# Patient Record
Sex: FEMALE | Race: WHITE
[De-identification: names, ages, dates, MRNs, and addresses within clinical notes are randomized per-mention and may not be internally consistent; named-entity substitution may affect disease eponyms.]

---

## 2019-04-23 ENCOUNTER — HOSPITAL ENCOUNTER (EMERGENCY)
Dept: HOSPITAL 80 - FED | Age: 34
Discharge: HOME | End: 2019-04-23
Payer: COMMERCIAL

## 2019-04-23 VITALS — DIASTOLIC BLOOD PRESSURE: 97 MMHG | SYSTOLIC BLOOD PRESSURE: 132 MMHG

## 2019-04-23 DIAGNOSIS — S92.501A: Primary | ICD-10-CM
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
## 2019-04-23 DIAGNOSIS — S90.121A: ICD-10-CM

## 2019-04-23 PROCEDURE — L4386 NON-PNEUM WALK BOOT PRE CST: HCPCS

## 2019-04-23 NOTE — EDPHY
H & P


Stated Complaint: foot injury


Time Seen by Provider: 04/23/19 19:28


HPI/ROS: 


HPI:  This is a 33-year-old female presents with





Chief Complaint:  Left little toe injury





Location:  Left little toe


Quality:  Injury


Duration:  Prior to arrival


Signs and Symptoms:  No bleeding, no radiation, no numbness, no weakness, no 

tingling, no incontinence, + decreased range of motion, + swelling, + pain, no 

fever


Timing:  Acute, constant, worse with weight-bearing


Severity:  7/10


Context:  Patient reports that she was lifting weights and accidentally dropped 

a 10 lb weight on her right little toe.  She reports that she felt immediate, 

constant, severe pain.  She complains of swelling base of right little toe pain 

that is sharp and constant nature.  The pain radiates up into her right ankle.  

Pain worsens weight-bearing.  Denies paresthesias, numbness, weakness.


Modifying Factors:  No over-the-counter medications or ice pack applied





Comment: 








ROS:  A comprehensive 10 system review of systems is otherwise negative aside 

from elements mentioned in the history of present illness. 








MEDICAL/SURGICAL/SOCIAL HISTORY: 


Medical history:  Hypothyroidism.  LMP 1-2 weeks ago.


Surgical history:  Denies


Social history:  Employed, nonsmoker.











CONSTITUTIONAL:  Appears uncomfortable, adult white female, awake and alert, no 

obvious distress


HEENT: Atraumatic and normocephalic, PERRL, EOMI.  Nares patent; no rhinorrhea;

  no nasal mucosal edema. Tympanic membranes clear. Oropharynx clear, no 

exudate and moist pink mucosa.  Airway patent.  No lymphadenopathy.  No 

meningismus.


Cardiovascular: Normal S1/S2, regular rate, regular rhythm, without murmur rub 

or gallop.


PULMONARY/CHEST:  Symmetrical and nontender. Clear to auscultation bilaterally. 

Good air movement. No accessory muscle usage.


ABDOMEN:  Soft, nondistended, nontender, no rebound, no guarding, no peritoneal 

signs, no masses or organomegaly. No CVAT.


EXTREMITIES:  2/2 pedal pulses, right Ankle: Plantar flexion to 50, 

dorsiflexion to 20.  Foot inversion to 35 degree.  No tenderness/swelling 

Anterior talofibular ligament.  No tenderness/swelling Calcaneofibular ligament

, no tenderness/swelling posterior talofibular ligament, no tenderness/swelling 

posterior inferior tibiofibular ligament. Achilles tendon intact.  Right 5th 

toe at the MTP joint shows tenderness with palpation with moderate swelling but 

no bruising.  Pain is increased with flexion.  Flexion and extension of the IP 

and MTP joints are fully intact.  Light touch sensation intact.  strength 5/5, 

no deformities, no clubbing, no cyanosis or edema.


NEUROLOGICAL: no focal neuro deficits.  GCS 15.


SKIN: Warm and dry, no erythema. no rash.  Good capillary refill. 


  





Source: Patient


Exam Limitations: No limitations





- Personal History


LMP (Females 10-55): 8-14 Days Ago


Current Tetanus/Diphtheria Vaccine: Yes


Current Tetanus Diphtheria and Acellular Pertussis (TDAP): Yes





- Medical/Surgical History


Hx Asthma: No


Hx Chronic Respiratory Disease: No


Hx Diabetes: No


Hx Cardiac Disease: No


Hx Renal Disease: No


Hx Cirrhosis: No


Hx Alcoholism: No


Hx HIV/AIDS: No


Hx Splenectomy or Spleen Trauma: No





- Social History


Smoking Status: Never smoked


Constitutional: 


 Initial Vital Signs











Heart Rate  76   04/23/19 19:11


 


Respiratory Rate  18   04/23/19 19:11


 


Blood Pressure  122/102 H  04/23/19 19:11


 


O2 Sat (%)  96   04/23/19 19:11








 











O2 Delivery Mode               Room Air














Allergies/Adverse Reactions: 


 





No Known Allergies Allergy (Unverified 04/23/19 19:10)


 








Home Medications: 














 Medication  Instructions  Recorded


 


Levothyroxine  04/23/19














Medical Decision Making





- Diagnostics


Imaging Results: 


 Imaging Impressions





Toe X-Ray  04/23/19 19:18


Impression: Suspect artifact rather than nondisplaced fracture distal phalanx 

fifth toe.











Procedures: 


Procedure:  Splint placement.





A right walking boot was applied.  After application of the splint I returned 

and re-examined the patient.  The splint was adequately immobilizing the joint 

and distal to the splint the patient's circulation and sensation was intact.





ED Course/Re-evaluation: 


Vital signs reviewed and stable upon arrival


Right foot x-ray at bedside shows suspected nondisplaced fracture of the 5th 

toe.


Radiology read: Suspect artifact rather than nondisplaced fracture distal 

phalanx fifth toe.


Patient is traveling on an airplane in 2 days.


Placed in walking boot, crutches, orthopedic follow-up





No signs of neurovascular compromise/tenting of skin/compartment syndrome/

extremities and joints examined above and below area of concern and are 

neurovascularly intact.








This patient was seen under the supervision of my secondary supervising 

physician.  I evaluated and cared for this patient independently. 











Differential Diagnosis: 


Ankle injury differential diagnosis includes but is not limited to tibia 

fracture, fibula fracture, metatarsal fracture, LisFranc fracture, achilles 

tendon rupture, sprain.








- Data Points


Medications Given: 


 








Discontinued Medications





Ibuprofen (Motrin)  600 mg PO EDNOW ONE


   Stop: 04/23/19 19:18


   Last Admin: 04/23/19 19:22 Dose:  600 mg








Departure





- Departure


Disposition: Home, Routine, Self-Care


Clinical Impression: 


Closed fracture of phalanx of right fifth toe


Qualifiers:


 Encounter type: initial encounter Qualified Code(s): S92.501A - Displaced 

unspecified fracture of right lesser toe(s), initial encounter for closed 

fracture





Contusion of fifth toe, right


Qualifiers:


 Encounter type: initial encounter Qualified Code(s): S90.121A - Contusion of 

right lesser toe(s) without damage to nail, initial encounter





Condition: Good


Instructions:  Crutch Instructions (ED), Toe Fracture (ED)


Additional Instructions: 


Wear the walking boot while out of bed until pain free or seen by orthopedic/

Podiatry.


Use crutches to aid ambulation.  Start with toe-touch weight-bearing status.


Take Tylenol 650 mg every 4 hours and/or Ibuprofen 600 mg every 8 hours with 

food as needed for pain. 


Apply ice for 30 minutes at a time; 2-3 times per day for the next 1-2 days. 


Follow up with Orthopedics/podiatry in 7-10 days if symptoms persist for 

suspected fracture of right little toe at which time they will evaluate and 

recommend with you if conservative management versus further imaging is 

indicated. 





Return to the ER immediately if you experience new or worsening pain, 

discoloration, numbness, tingling, or any other symptoms that concern you.











Referrals: 


INDIA CHAMBERS MD [Other] - As per Instructions


Enrico Wolff MD [Doctor of Podiatric Medicine] - As per Instructions


Stand Alone Forms:  Airline Excuse